# Patient Record
Sex: MALE | Race: WHITE | NOT HISPANIC OR LATINO | Employment: OTHER | URBAN - METROPOLITAN AREA
[De-identification: names, ages, dates, MRNs, and addresses within clinical notes are randomized per-mention and may not be internally consistent; named-entity substitution may affect disease eponyms.]

---

## 2020-11-02 PROBLEM — R94.30 NONSPECIFIC ABNORMAL FUNCTION STUDY, CARDIOVASCULAR: Status: ACTIVE | Noted: 2020-11-02

## 2020-11-04 ENCOUNTER — OFFICE VISIT (OUTPATIENT)
Dept: DIABETES | Facility: CLINIC | Age: 55
End: 2020-11-04
Payer: MEDICAID

## 2020-11-04 ENCOUNTER — TELEPHONE (OUTPATIENT)
Dept: SURGERY | Facility: CLINIC | Age: 55
End: 2020-11-04

## 2020-11-04 VITALS
HEIGHT: 68 IN | SYSTOLIC BLOOD PRESSURE: 148 MMHG | DIASTOLIC BLOOD PRESSURE: 90 MMHG | WEIGHT: 192 LBS | BODY MASS INDEX: 29.1 KG/M2 | HEART RATE: 67 BPM | OXYGEN SATURATION: 99 %

## 2020-11-04 DIAGNOSIS — Z11.59 NEED FOR HEPATITIS C SCREENING TEST: ICD-10-CM

## 2020-11-04 DIAGNOSIS — Z11.4 ENCOUNTER FOR SCREENING FOR HIV: ICD-10-CM

## 2020-11-04 DIAGNOSIS — E78.5 DYSLIPIDEMIA, GOAL LDL BELOW 70: ICD-10-CM

## 2020-11-04 DIAGNOSIS — I10 ESSENTIAL HYPERTENSION: ICD-10-CM

## 2020-11-04 DIAGNOSIS — E11.9 NEW ONSET TYPE 2 DIABETES MELLITUS: ICD-10-CM

## 2020-11-04 DIAGNOSIS — E11.9 NEW ONSET OF TYPE 2 DIABETES MELLITUS IN PEDIATRIC PATIENT: ICD-10-CM

## 2020-11-04 DIAGNOSIS — Z95.5 S/P CORONARY ARTERY STENT PLACEMENT: ICD-10-CM

## 2020-11-04 DIAGNOSIS — E66.3 OVERWEIGHT WITH BODY MASS INDEX (BMI) 25.0-29.9: ICD-10-CM

## 2020-11-04 DIAGNOSIS — E11.65 TYPE 2 DIABETES MELLITUS WITH HYPERGLYCEMIA, WITHOUT LONG-TERM CURRENT USE OF INSULIN: Primary | ICD-10-CM

## 2020-11-04 DIAGNOSIS — E78.1 HYPERTRIGLYCERIDEMIA: ICD-10-CM

## 2020-11-04 DIAGNOSIS — Z71.9 HEALTH EDUCATION/COUNSELING: ICD-10-CM

## 2020-11-04 DIAGNOSIS — I10 ELEVATED BLOOD PRESSURE READING IN OFFICE WITH DIAGNOSIS OF HYPERTENSION: ICD-10-CM

## 2020-11-04 DIAGNOSIS — I25.10 CORONARY ARTERY DISEASE, ANGINA PRESENCE UNSPECIFIED, UNSPECIFIED VESSEL OR LESION TYPE, UNSPECIFIED WHETHER NATIVE OR TRANSPLANTED HEART: ICD-10-CM

## 2020-11-04 PROCEDURE — 99999 PR PBB SHADOW E&M-EST. PATIENT-LVL V: ICD-10-PCS | Mod: PBBFAC,,, | Performed by: NURSE PRACTITIONER

## 2020-11-04 PROCEDURE — 99999 PR PBB SHADOW E&M-EST. PATIENT-LVL V: CPT | Mod: PBBFAC,,, | Performed by: NURSE PRACTITIONER

## 2020-11-04 PROCEDURE — 99204 OFFICE O/P NEW MOD 45 MIN: CPT | Mod: S$PBB,,, | Performed by: NURSE PRACTITIONER

## 2020-11-04 PROCEDURE — 99215 OFFICE O/P EST HI 40 MIN: CPT | Mod: PBBFAC,PN | Performed by: NURSE PRACTITIONER

## 2020-11-04 PROCEDURE — 99204 PR OFFICE/OUTPT VISIT, NEW, LEVL IV, 45-59 MIN: ICD-10-PCS | Mod: S$PBB,,, | Performed by: NURSE PRACTITIONER

## 2020-11-04 RX ORDER — CLOPIDOGREL BISULFATE 75 MG/1
75 TABLET ORAL DAILY
Status: ON HOLD | COMMUNITY

## 2020-11-04 RX ORDER — EMPAGLIFLOZIN 10 MG/1
10 TABLET, FILM COATED ORAL DAILY
Qty: 30 TABLET | Refills: 1 | Status: SHIPPED | OUTPATIENT
Start: 2020-11-04 | End: 2020-11-06

## 2020-11-04 RX ORDER — LOSARTAN POTASSIUM 50 MG/1
25 TABLET ORAL DAILY
Status: ON HOLD | COMMUNITY
Start: 2020-09-18

## 2020-11-04 NOTE — ASSESSMENT & PLAN NOTE
Body mass index is 29.19 kg/m².  Increases insulin resistance.   Discussed DM diet and exercise.

## 2020-11-04 NOTE — PATIENT INSTRUCTIONS
Blood sugar goals:  Goal on medication 100-120  Short term goal 140-150's       Continue Metformin 500 mg 2 times per day   Start Jardiance 10 mg daily we will repeat kidney function in about 5-6 weeks   -- Please let us know if you have nausea, vomiting, or weakness with a normal BG. This could be euglycemic DKA.  -- please hold medication 3 days prior to surgery or if you are sick and have decreased intake.   -- Please drink lots of water daily while on this medication.   --This medication could also give you a yeast infection- please let us know if this occurs and we can treat it.         Snacks can be an important part of a balanced, healthy meal plan. They allow you to eat more frequently, feeling full and satisfied throughout the day. Also, they allow you to spread carbohydrates evenly, which may stabilize blood sugars.  Plus, snacks are enjoyable!     The amount of carbohydrate needed at snacks varies. Generally, less than 15 grams of carbohydrate per snack is recommended.  Below you will find some tasty treats.       0-5 gm carb   Crystal Light   Vitamin Water Zero   Herbal tea, unsweetened   2 tsp peanut butter on celery   1./2 cup sugar-free jell-o   1 sugar-free popsicle   ¼ cup blueberries   8oz Blue Denise unsweetened almond milk   5 baby carrots & celery sticks, cucumbers, bell peppers dipped in ¼ cup salsa, 2Tbsp light ranch dressing or 2Tbsp plain Greek yogurt   10 Goldfish crackers   ½ oz low-fat cheese or string cheese   1 closed handful of nuts, unsalted   1 Tbsp of sunflower seeds, unsalted   1 cup Smart Pop popcorn   1 whole grain brown rice cake        15 gm carb   1 small piece of fruit or ½ banana or 1/2 cup lite canned fruit   3 edvin cracker squares   3 cups Smart Pop popcorn, top spray butter, Gan lite salt or cinnamon and Truvia   5 Vanilla Wafers   ½ cup low fat, no added sugar ice cream or frozen yogurt (Blue bell, Blue Bunny, Weight Watchers, Skinny Cow)   ½  turkey, ham, or chicken sandwich   ½ c fruit with ½ c Cottage cheese   4-6 unsalted wheat crackers with 1 oz low fat cheese or 1 tbsp peanut butter    30-45 goldfish crackers (depending on flavor)    7-8 Yazdanism mini brown rice cakes (caramel, apple cinnamon, chocolate)    12 Yazdanism mini brown rice cakes (cheddar, bbq, ranch)    1/3 cup hummus dip with raw veg   1/2 whole wheat sal, 1Tbsp hummus   Mini Pizza (1/2 whole wheat English muffin, low-fat  cheese, tomato sauce)   100 calorie snack pack (Oreo, Chips Ahoy, Ritz Mix, Baked Cheetos)   4-6 oz. light or Greek Style yogurt (Chobani, Yoplait, Okios, [place]oneyfield)   ½ cup sugar-free pudding     6 in. wheat tortilla or sal oven toasted chips (topped with spray butter flavoring, cinnamon, Truvia OR spray butter, garlic powder, chili powder)    18 BBQ Popchips (available at [employer], [employer], Fresh Market)

## 2020-11-04 NOTE — ASSESSMENT & PLAN NOTE
BP goal is < 140/90.   Tolerating  ARB   Blood pressure goals discussed with patient  BP above goal in clinic today. He has not taken his BP medication today   Reportedly BP runs 120's/70's at home

## 2020-11-04 NOTE — ASSESSMENT & PLAN NOTE
Uncontrolled.   BG readings are above goal despite Metformin and dietary changes.   He does not want to maximize the Metformin at this time.       -- Medication Changes:   Continue metformin 500 mg b.i.d.  Start Jardiance 10 mg daily--repeat BMP in 5 weeks--then will likely increase to 25 mg daily      -- Reviewed goals of therapy are to get the best control we can without hypoglycemia.   -- Advised frequent self blood glucose monitoring.  Patient encouraged to document glucose results and bring them to every clinic visit.  2 times per day at alternating times.  Logs provided  -- Hypoglycemia precautions discussed. Instructed on precautions before driving.    -- Call for Bg repeatedly < 90 or > 180.   -- Close adherence to lifestyle changes recommended.   -- Periodic follow ups for eye evaluations, foot care and dental care suggested.  -- Refer to diabetes education-- new onset diabetes, medications, comprehensive review, diet -- focus on meal planning     --he reports an upcoming eye exam  --he would like to get a colonoscopy--will send message to James

## 2020-11-04 NOTE — ASSESSMENT & PLAN NOTE
On statin per ADA recommendations  LDL goal < 70 ( cardiac hx and risk factors).   Lipids with RTC

## 2020-11-04 NOTE — PROGRESS NOTES
CC:   Chief Complaint   Patient presents with    Diabetes Mellitus     Pt just had a stent placed in his right arm and states that he has not been taking his meds due to this. He feels that his bg levels maybe off bc of the health issues hes been dealing with.       HPI: Lloyd Alonzo is a 55 y.o. male presents for an initial visit today for the management of T2DM.     He was diagnosed with Type 2 diabetes in September 2020 on routine lab work. His A1c was 12% and BG was >400.  Attest to dietary indiscretions prior to diagnosis. He was started on Metformin in mid September 2020. He has never been on insulin therapy.   Since diagnosis he has stopped all snowballs, bread, rice, and pasta.  He reports his blood sugars are still running in the 160s to 200s.  He does not want to increase his metformin as he fears side effects.  At this time he denies any diarrhea, abdominal pain, abdominal bloating.    He does have a significant family history of type 2 diabetes and cardiovascular disease.  He is following with a cardiologist on the [place]- Dr. Hoffman who discovered his type 2 diabetes on routine lab work.  Will request labs from him to review.  He did have an angiogram with 1 stent placement on 11/02--he is supposed to start Plavix.  His cardiologist started him on the metformin, losartan, Crestor, fenofibrate.  Prior to diagnosis in September of 2020 he had not had a routine physical or lab work in over 5 years.    Today in clinic is blood pressure is above goal--he reports that he has not had any of his medications in the last 2 days.  His metformin is currently on hold until tomorrow due to cardiac catheterization    Family hx of diabetes: father, brother, sister, possibly mother   Hospitalized for diabetes: denies     No personal or FH of thyroid cancer or personal of pancreatic cancer or pancreatitis.     DIABETES COMPLICATIONS: cardiovascular disease    11/2020- 1 stent placed     Diabetes Management  Status    ASA:  Yes - 81 mg daily -- going to start Plavix     Statin: Taking-- Crestor and fenofibrate   ACE/ARB: Taking -- Losartan     Screening or Prevention Patient's value Goal Complete/Controlled?   HgA1C Testing and Control   No results found for: HGBA1C   Annually/Less than 8% No   Lipid profile Most Recent Lipid Panel Health Maintenance Topic Completion: Not Found Annually No   LDL control No results found for: LDLCALC Annually/Less than 100 mg/dl  No   Nephropathy screening No results found for: LABMICR  Lab Results   Component Value Date    PROTEINUA Negative 05/24/2019    Annually No   Blood pressure BP Readings from Last 1 Encounters:   11/04/20 (!) 148/90    Less than 140/90 No   Dilated retinal exam Most Recent Eye Exam Date: Not Found-- external-- in [place] -- has an upcoming appointment.  Annually Yes   Foot exam   Most Recent Foot Exam Date: Not Found Annually Yes       CURRENT A1C:    No results found for: HGBA1C    GOAL A1C: 6.5% without hypoglycemia       DM MEDICATIONS USED IN THE PAST: Metformin     CURRENT DIABETES MEDICATIONS: Metformin 500 mg BID   Insulin: N/A   Missed doses: denies  Right now on hold due to cardiac catheterization       BLOOD GLUCOSE MONITORING:  He checks his BG 1-2 times per day   Per oral recall: 200's mostly   Lowest was 140 one day   170  194      HYPOGLYCEMIA:  No      MEALS: eating 3 meals per day   Red meat, a lot of chicken   No bread, rice, pasta since diagnosis   Can't eat seafood due to allergy   Snack: almonds and walnuts and boiled eggs   Drinks: diet soda and crystal light        CURRENT EXERCISE:  No    Review of Systems  Review of Systems   Constitutional: Negative for appetite change, fatigue and unexpected weight change.   HENT: Negative for trouble swallowing.    Eyes: Negative for visual disturbance.   Respiratory: Negative for shortness of breath.    Cardiovascular: Negative for chest pain.   Gastrointestinal: Negative for nausea.   Endocrine:  Negative for polydipsia, polyphagia and polyuria.   Genitourinary:        No Nocturia    Skin: Negative for wound.   Neurological: Negative for numbness.       Physical Exam   Physical Exam  Vitals signs and nursing note reviewed.   Constitutional:       Appearance: He is well-developed.      Comments: Overweight male patient    HENT:      Head: Normocephalic and atraumatic.      Right Ear: External ear normal.      Left Ear: External ear normal.      Nose: Nose normal.   Neck:      Musculoskeletal: Normal range of motion and neck supple.      Thyroid: No thyromegaly.      Trachea: No tracheal deviation.   Cardiovascular:      Rate and Rhythm: Normal rate and regular rhythm.      Heart sounds: No murmur.   Pulmonary:      Effort: Pulmonary effort is normal. No respiratory distress.      Breath sounds: Normal breath sounds.   Abdominal:      Palpations: Abdomen is soft.      Tenderness: There is no abdominal tenderness.      Hernia: No hernia is present.   Skin:     General: Skin is warm and dry.      Capillary Refill: Capillary refill takes less than 2 seconds.      Findings: No rash.      Comments:      Neurological:      Mental Status: He is alert and oriented to person, place, and time.      Cranial Nerves: No cranial nerve deficit.   Psychiatric:         Behavior: Behavior normal.         Judgment: Judgment normal.         FOOT EXAMINATION: wearing Crocs     Protective Sensation (w/ 10 gram monofilament):  Right: Intact  Left: Intact    Visual Inspection:  Normal -  Bilateral, Nails Intact - without Evidence of Foot Deformity- Bilateral, Dry Skin -  Bilateral and Onychomycosis -  Left    Pedal Pulses:   Right: Present  Left: Present    Posterior tibialis:   Right:Present  Left: Present        Lab Results   Component Value Date    TSH 2.07 05/24/2019           Type 2 diabetes mellitus with hyperglycemia, without long-term current use of insulin  Uncontrolled.   BG readings are above goal despite Metformin and  dietary changes.   He does not want to maximize the Metformin at this time.       -- Medication Changes:   Continue metformin 500 mg b.i.d.  Start Jardiance 10 mg daily--repeat BMP in 5 weeks--then will likely increase to 25 mg daily      -- Reviewed goals of therapy are to get the best control we can without hypoglycemia.   -- Advised frequent self blood glucose monitoring.  Patient encouraged to document glucose results and bring them to every clinic visit.  2 times per day at alternating times.  Logs provided  -- Hypoglycemia precautions discussed. Instructed on precautions before driving.    -- Call for Bg repeatedly < 90 or > 180.   -- Close adherence to lifestyle changes recommended.   -- Periodic follow ups for eye evaluations, foot care and dental care suggested.  -- Refer to diabetes education-- new onset diabetes, medications, comprehensive review, diet -- focus on meal planning     --he reports an upcoming eye exam  --he would like to get a colonoscopy--will send message to James    Dyslipidemia, goal LDL below 70  On statin per ADA recommendations  LDL goal < 70 ( cardiac hx and risk factors).   Lipids with RTC         Hypertriglyceridemia  On fenofibrate and statin   Optimize BG readings   Check lipids with RTC     Essential hypertension  BP goal is < 140/90.   Tolerating  ARB   Blood pressure goals discussed with patient  BP above goal in clinic today. He has not taken his BP medication today   Reportedly BP runs 120's/70's at home     CAD (coronary artery disease)  Optimize BG readings.   Continue to follow with cardiology       S/P coronary artery stent placement  Optimize BG readings.   See above.       Overweight with body mass index (BMI) 25.0-29.9  Body mass index is 29.19 kg/m².  Increases insulin resistance.   Discussed DM diet and exercise.       Spent 60 minutes with patient with >50% time spent in counseling on diet, medication options, new diagnosis.       Addendum 11/06/2020---insurance  has denied the request for Jardiance PA.  Rationale being that he needs to be on metformin for at least 90 days within the previous 180..  Started metformin after diagnosis of diabetes in September 2020  Will increase metformin to 1000 mg b.i.d. and add in glipizide 5 mg extended release daily with breakfast    Addendum 11/06/2020--received external labs from cardiologist Dr. Hoffman office.  His A1c was 12.3% on labs drawn in September 2020 and his triglycerides were 1848  See attached documents for scanned records      Follow up in about 6 weeks (around 12/16/2020).  Request records from cardiologist please   Schedule with [facility] DM education- diet, new diagnosis of diabetes, comprehensive review   Labs in 5 weeks.   F/u with me in 6 weeks.       Orders Placed This Encounter   Procedures    Basic Metabolic Panel     Standing Status:   Future     Standing Expiration Date:   1/3/2022    Hemoglobin A1C     Standing Status:   Future     Standing Expiration Date:   5/4/2022    Lipid Panel     Standing Status:   Future     Standing Expiration Date:   5/4/2022    Microalbumin/Creatinine Ratio, Urine     Standing Status:   Future     Standing Expiration Date:   5/4/2022     Order Specific Question:   Specimen Source     Answer:   Urine    Hepatitis C Antibody     Standing Status:   Future     Standing Expiration Date:   5/4/2022    HIV 1/2 Ag/Ab (4th Gen)     Standing Status:   Future     Standing Expiration Date:   5/4/2022    Ambulatory referral/consult to Diabetes Education     Standing Status:   Future     Standing Expiration Date:   11/4/2021     Referral Priority:   Routine     Referral Type:   Consultation     Referral Reason:   Specialty Services Required     Referred to Provider:   Maite Mukherjee RD, CDE     Requested Specialty:   Diabetes     Number of Visits Requested:   1       Recommendations were discussed with the patient in detail  The patient verbalized understanding and agrees with the plan outlined  as above.     This note was partly generated with [employer] voice recognition software. I apologize for any possible typographical errors.

## 2020-11-05 ENCOUNTER — CLINICAL SUPPORT (OUTPATIENT)
Dept: DIABETES | Facility: CLINIC | Age: 55
End: 2020-11-05
Payer: MEDICAID

## 2020-11-05 VITALS — WEIGHT: 192 LBS | BODY MASS INDEX: 29.1 KG/M2 | HEIGHT: 68 IN

## 2020-11-05 DIAGNOSIS — E11.65 TYPE 2 DIABETES MELLITUS WITH HYPERGLYCEMIA, WITHOUT LONG-TERM CURRENT USE OF INSULIN: ICD-10-CM

## 2020-11-05 PROCEDURE — 99999 PR PBB SHADOW E&M-EST. PATIENT-LVL III: ICD-10-PCS | Mod: PBBFAC,,, | Performed by: DIETITIAN, REGISTERED

## 2020-11-05 PROCEDURE — 99213 OFFICE O/P EST LOW 20 MIN: CPT | Mod: PBBFAC,PN | Performed by: DIETITIAN, REGISTERED

## 2020-11-05 PROCEDURE — 99999 PR PBB SHADOW E&M-EST. PATIENT-LVL III: CPT | Mod: PBBFAC,,, | Performed by: DIETITIAN, REGISTERED

## 2020-11-06 ENCOUNTER — TELEPHONE (OUTPATIENT)
Dept: DIABETES | Facility: CLINIC | Age: 55
End: 2020-11-06

## 2020-11-06 DIAGNOSIS — E11.65 TYPE 2 DIABETES MELLITUS WITH HYPERGLYCEMIA, WITHOUT LONG-TERM CURRENT USE OF INSULIN: Primary | ICD-10-CM

## 2020-11-06 RX ORDER — METFORMIN HYDROCHLORIDE 1000 MG/1
1000 TABLET ORAL 2 TIMES DAILY WITH MEALS
Qty: 180 TABLET | Refills: 1 | Status: SHIPPED | OUTPATIENT
Start: 2020-11-06 | End: 2021-09-13

## 2020-11-06 RX ORDER — GLIPIZIDE 5 MG/1
5 TABLET, FILM COATED, EXTENDED RELEASE ORAL
Qty: 90 TABLET | Refills: 1 | Status: SHIPPED | OUTPATIENT
Start: 2020-11-06 | End: 2020-12-16 | Stop reason: SINTOL

## 2020-11-06 NOTE — TELEPHONE ENCOUNTER
Left vm for pt in ref:  Please call patient and let him know that they have denied his Jardiance.  The reason being is that he has not been on metformin for at least 90 days.  My recommendations would be to increase his metformin to a 1000 mg 2 times per day.  We will also add in a medication called glipizide 5 mg daily with breakfast.   Make sure to take the Glipizide with food.   We can try to submit for the Jardiance after his next visit when he has been on the metformin for at least 90 days.   I have sent prescriptions to his pharmacy   Thank you

## 2020-11-06 NOTE — TELEPHONE ENCOUNTER
Please call patient and let him know that they have denied his Jardiance.  The reason being is that he has not been on metformin for at least 90 days.  My recommendations would be to increase his metformin to a 1000 mg 2 times per day.  We will also add in a medication called glipizide 5 mg daily with breakfast.   Make sure to take the Glipizide with food.   We can try to submit for the Jardiance after his next visit when he has been on the metformin for at least 90 days.   I have sent prescriptions to his pharmacy   Thank you

## 2020-11-10 ENCOUNTER — TELEPHONE (OUTPATIENT)
Dept: SURGERY | Facility: CLINIC | Age: 55
End: 2020-11-10

## 2020-11-10 NOTE — TELEPHONE ENCOUNTER
----- Message from Monisha Mark NP sent at 11/4/2020 10:15 AM CST -----  Please reach out to patient to schedule colonoscopy.     Thank you

## 2020-11-11 LAB
LEFT EYE DM RETINOPATHY: NEGATIVE
RIGHT EYE DM RETINOPATHY: NEGATIVE

## 2020-11-13 ENCOUNTER — TELEPHONE (OUTPATIENT)
Dept: SURGERY | Facility: CLINIC | Age: 55
End: 2020-11-13

## 2020-11-17 ENCOUNTER — PATIENT OUTREACH (OUTPATIENT)
Dept: ADMINISTRATIVE | Facility: HOSPITAL | Age: 55
End: 2020-11-17

## 2020-12-01 ENCOUNTER — TELEPHONE (OUTPATIENT)
Dept: DIABETES | Facility: CLINIC | Age: 55
End: 2020-12-01

## 2020-12-01 NOTE — TELEPHONE ENCOUNTER
Patient advised he spoke with Ying or Kaylie. Advised he could get xray's prior to appt and would like to be seen sooner. No Referral is self referral

## 2020-12-01 NOTE — TELEPHONE ENCOUNTER
"----- Message from Shashi Whitmore sent at 12/1/2020  3:03 PM CST -----   Consult/Advisory:    Name Of Caller: Lloyd  Contact Preference?: [phone]    Does patient feel the need to be seen today? No     What is the nature of the call?:  -pt returning call to Fouzia Cartwright RN    Additional Notes:  "Thank you for all that you do for our patients'"    "

## 2020-12-03 ENCOUNTER — TELEPHONE (OUTPATIENT)
Dept: ORTHOPEDICS | Facility: CLINIC | Age: 55
End: 2020-12-03

## 2020-12-03 DIAGNOSIS — M25.511 RIGHT SHOULDER PAIN, UNSPECIFIED CHRONICITY: Primary | ICD-10-CM

## 2020-12-03 NOTE — TELEPHONE ENCOUNTER
Spoke with pt. Advised pt that xrays were ordered for his shoulder and if a sooner appt becomes available I will call him. All questions answered. Pt verbalized understanding.

## 2020-12-03 NOTE — TELEPHONE ENCOUNTER
----- Message from Brooke Snyder sent at 12/3/2020  4:33 PM CST -----  Contact: Lloyd # [phone]  Pt calling to get appt for xrays

## 2020-12-09 ENCOUNTER — TELEPHONE (OUTPATIENT)
Dept: ORTHOPEDICS | Facility: CLINIC | Age: 55
End: 2020-12-09

## 2020-12-09 DIAGNOSIS — E11.65 TYPE 2 DIABETES MELLITUS WITH HYPERGLYCEMIA, WITHOUT LONG-TERM CURRENT USE OF INSULIN: Primary | ICD-10-CM

## 2020-12-09 NOTE — TELEPHONE ENCOUNTER
Spoke with pt. Offered pt a sooner appt. appt rescheduled. All questions answered. Pt verbalized understanding.

## 2020-12-09 NOTE — TELEPHONE ENCOUNTER
----- Message from Joan Stewart sent at 12/9/2020  2:29 PM CST -----  Contact: pt  Returning a call.        Call back :[phone]

## 2020-12-11 ENCOUNTER — OFFICE VISIT (OUTPATIENT)
Dept: ORTHOPEDICS | Facility: CLINIC | Age: 55
End: 2020-12-11
Payer: MEDICAID

## 2020-12-11 VITALS
TEMPERATURE: 98 F | OXYGEN SATURATION: 98 % | BODY MASS INDEX: 29.4 KG/M2 | WEIGHT: 194 LBS | RESPIRATION RATE: 18 BRPM | HEIGHT: 68 IN

## 2020-12-11 DIAGNOSIS — M75.101 ROTATOR CUFF SYNDROME, RIGHT: ICD-10-CM

## 2020-12-11 DIAGNOSIS — M75.21 BICEPS TENDINITIS OF RIGHT SHOULDER: ICD-10-CM

## 2020-12-11 PROCEDURE — 99214 OFFICE O/P EST MOD 30 MIN: CPT | Mod: PBBFAC,PN | Performed by: ORTHOPAEDIC SURGERY

## 2020-12-11 PROCEDURE — 99999 PR PBB SHADOW E&M-EST. PATIENT-LVL IV: ICD-10-PCS | Mod: PBBFAC,,, | Performed by: ORTHOPAEDIC SURGERY

## 2020-12-11 PROCEDURE — 99203 OFFICE O/P NEW LOW 30 MIN: CPT | Mod: S$PBB,,, | Performed by: ORTHOPAEDIC SURGERY

## 2020-12-11 PROCEDURE — 99203 PR OFFICE/OUTPT VISIT, NEW, LEVL III, 30-44 MIN: ICD-10-PCS | Mod: S$PBB,,, | Performed by: ORTHOPAEDIC SURGERY

## 2020-12-11 PROCEDURE — 99999 PR PBB SHADOW E&M-EST. PATIENT-LVL IV: CPT | Mod: PBBFAC,,, | Performed by: ORTHOPAEDIC SURGERY

## 2020-12-11 RX ORDER — METOPROLOL SUCCINATE 25 MG/1
TABLET, EXTENDED RELEASE ORAL
Status: ON HOLD | COMMUNITY
Start: 2020-11-17

## 2020-12-11 RX ORDER — DICLOFENAC SODIUM 75 MG/1
75 TABLET, DELAYED RELEASE ORAL 2 TIMES DAILY
Qty: 60 TABLET | Refills: 1 | Status: ON HOLD | OUTPATIENT
Start: 2020-12-11

## 2020-12-11 NOTE — PROGRESS NOTES
Subjective:    Patient ID:  Lloyd Alonzo is a 55 y.o. y.o. male who presents for initial visit for Pain of the Right Shoulder      54 yo male, RHD, reports longstanding h/o right shoulder terri, insidious onset. Pain localized anteriorly, sharp and aggravated with overhead motions, reaching behind the back and when he lies on his right side at night. Denies neck pain, radiating arm pain, motor weakness, paresthesias or constitutional sxs. Has not had any specific treatment.             Past Medical History:   Diagnosis Date    Anticoagulant long-term use     Diabetes mellitus     General anesthetics causing adverse effect in therapeutic use     Hyperlipidemia     Hypertension         Past Surgical History:   Procedure Laterality Date    APPENDECTOMY      CORONARY ANGIOGRAPHY N/A 11/2/2020    Procedure: ANGIOGRAM, CORONARY ARTERY;  Surgeon: Esvin Hoffman MD;  Location: [facility];  Service: Cardiology;  Laterality: N/A;       Review of patient's allergies indicates:   Allergen Reactions    Shellfish containing products Swelling          Current Outpatient Medications:     aspirin (ECOTRIN) 81 MG EC tablet, Take 81 mg by mouth once daily., Disp: , Rfl:     clopidogreL (PLAVIX) 75 mg tablet, Take 75 mg by mouth once daily., Disp: , Rfl:     fenofibrate (TRICOR) 145 MG tablet, Take 145 mg by mouth once daily., Disp: , Rfl:     glipiZIDE (GLUCOTROL) 5 MG TR24, Take 1 tablet (5 mg total) by mouth daily with breakfast., Disp: 90 tablet, Rfl: 1    losartan (COZAAR) 50 MG tablet, Take 25 mg by mouth once daily. , Disp: , Rfl:     metFORMIN (GLUCOPHAGE) 1000 MG tablet, Take 1 tablet (1,000 mg total) by mouth 2 (two) times daily with meals., Disp: 180 tablet, Rfl: 1    metoprolol succinate (TOPROL-XL) 25 MG 24 hr tablet, TK 1/2 T PO QD, Disp: , Rfl:     rosuvastatin (CRESTOR) 10 MG tablet, Take 10 mg by mouth once daily., Disp: , Rfl:     Social History     Socioeconomic History    Marital status: Legally  "     Spouse name: Not on file    Number of children: Not on file    Years of education: Not on file    Highest education level: Not on file   Occupational History    Not on file   Social Needs    Financial resource strain: Not on file    Food insecurity     Worry: Not on file     Inability: Not on file    Transportation needs     Medical: Not on file     Non-medical: Not on file   Tobacco Use    Smoking status: Never Smoker    Smokeless tobacco: Never Used    Tobacco comment: cigar now and then   Substance and Sexual Activity    Alcohol use: No     Frequency: Never    Drug use: No    Sexual activity: Not on file   Lifestyle    Physical activity     Days per week: Not on file     Minutes per session: Not on file    Stress: Not on file   Relationships    Social connections     Talks on phone: Not on file     Gets together: Not on file     Attends Christianity service: Not on file     Active member of club or organization: Not on file     Attends meetings of clubs or organizations: Not on file     Relationship status: Not on file   Other Topics Concern    Not on file   Social History Narrative    Not on file        No family history on file.     Review of Systems   Constitutional: Negative for chills and fever.   HENT: Negative for hearing loss.    Eyes: Negative for blurred vision.   Respiratory: Negative for shortness of breath.    Cardiovascular: Negative for chest pain.   Gastrointestinal: Negative for nausea and vomiting.   Genitourinary: Negative for dysuria.   Musculoskeletal: Negative for myalgias.   Skin: Negative for rash.   Neurological: Negative for speech change and loss of consciousness.   Endo/Heme/Allergies: Does not bruise/bleed easily.   Psychiatric/Behavioral: Negative for depression.        Objective:     Temp 97.5 °F (36.4 °C) (Temporal)   Resp 18   Ht 5' 8" (1.727 m)   Wt 88 kg (194 lb 0.1 oz)   SpO2 98%   BMI 29.50 kg/m²     Ortho Exam     56 yo male in NAD; alert, " oriented x 3; normal mood and affect    Head: atraumatic  Eyes: EOM are normal. Right eye exhibits no discharge. Left eye exhibits no discharge  Cardiovascular: normal rate    Pulmonary/Chest: effort normal; no respiratory distress  Abdominal: soft    BUE: N/V intact    Right shoulder: tender bicipital groove; NT AC joint; FROM except IR to L1; positive Neer's/Hawkin's/Mila's/[place]co's; negative Speed's/Yergason's/drop arm/lift-off      Imaging:     X-rays 3-view right shoulder dated 12/9/2020 are independently reviewed by me and show mild AC joint degenerative change; Type II acromion.      Assessment & Plan:      1. Rotator cuff syndrome, right    2. Biceps tendinitis of right shoulder       1.  Findings, diagnosis, treatment options/risks/benefits were reviewed  2.  Voltaren 75 mg po bid  3.  PT  4.  Maintain judicious activity modification/limitation  5.  Follow-up in 3 months, consider subacromial cortisone injection right shoulder if sxs persist and/o worsen

## 2020-12-14 ENCOUNTER — TELEPHONE (OUTPATIENT)
Dept: DIABETES | Facility: CLINIC | Age: 55
End: 2020-12-14

## 2020-12-14 NOTE — TELEPHONE ENCOUNTER
"----- Message from Medina Borden sent at 12/14/2020  1:51 PM CST -----  Patient Assist    Name of caller:  Lloyd   Contact Preference:  [phone]   Does Patient feel the need to see the MD today? No   Physician:   What is the nature of the call?    - pt missed call regarding his results and scheduling. Please attempt to reach him again    Additional Notes:   "Thank you for all that you do for our patients'"          "

## 2020-12-15 ENCOUNTER — TELEPHONE (OUTPATIENT)
Dept: DIABETES | Facility: CLINIC | Age: 55
End: 2020-12-15

## 2020-12-15 NOTE — TELEPHONE ENCOUNTER
"----- Message from Medina Borden sent at 12/15/2020  2:58 PM CST -----  Patient Assist    Name of caller:  Lloyd   Contact Preference:  [phone]  Does Patient feel the need to see the MD today? No   Physician:   What is the nature of the call?    - please call pt per his request to consult with his nurse.     Additional Notes:   "Thank you for all that you do for our patients'"          "

## 2020-12-16 ENCOUNTER — TELEPHONE (OUTPATIENT)
Dept: DIABETES | Facility: CLINIC | Age: 55
End: 2020-12-16

## 2020-12-16 DIAGNOSIS — E11.65 TYPE 2 DIABETES MELLITUS WITH HYPERGLYCEMIA, WITHOUT LONG-TERM CURRENT USE OF INSULIN: Primary | ICD-10-CM

## 2020-12-16 RX ORDER — EMPAGLIFLOZIN 10 MG/1
10 TABLET, FILM COATED ORAL DAILY
Qty: 30 TABLET | Refills: 0 | Status: ON HOLD | OUTPATIENT
Start: 2020-12-16

## 2021-01-21 ENCOUNTER — TELEPHONE (OUTPATIENT)
Dept: DIABETES | Facility: CLINIC | Age: 56
End: 2021-01-21

## 2021-03-11 ENCOUNTER — TELEPHONE (OUTPATIENT)
Dept: ORTHOPEDICS | Facility: CLINIC | Age: 56
End: 2021-03-11

## 2021-03-17 ENCOUNTER — OFFICE VISIT (OUTPATIENT)
Dept: UROLOGY | Facility: CLINIC | Age: 56
End: 2021-03-17
Payer: MEDICAID

## 2021-03-17 VITALS
HEIGHT: 68 IN | DIASTOLIC BLOOD PRESSURE: 75 MMHG | WEIGHT: 187.19 LBS | BODY MASS INDEX: 28.37 KG/M2 | HEART RATE: 74 BPM | SYSTOLIC BLOOD PRESSURE: 129 MMHG

## 2021-03-17 DIAGNOSIS — N52.1 ERECTILE DYSFUNCTION DUE TO TYPE 2 DIABETES MELLITUS: ICD-10-CM

## 2021-03-17 DIAGNOSIS — E11.69 ERECTILE DYSFUNCTION DUE TO TYPE 2 DIABETES MELLITUS: ICD-10-CM

## 2021-03-17 DIAGNOSIS — Z12.5 ENCOUNTER FOR PROSTATE CANCER SCREENING: Primary | ICD-10-CM

## 2021-03-17 PROCEDURE — 99999 PR PBB SHADOW E&M-EST. PATIENT-LVL III: CPT | Mod: PBBFAC,,, | Performed by: NURSE PRACTITIONER

## 2021-03-17 PROCEDURE — 99213 OFFICE O/P EST LOW 20 MIN: CPT | Mod: PBBFAC,PN | Performed by: NURSE PRACTITIONER

## 2021-03-17 PROCEDURE — 99999 PR PBB SHADOW E&M-EST. PATIENT-LVL III: ICD-10-PCS | Mod: PBBFAC,,, | Performed by: NURSE PRACTITIONER

## 2021-03-17 PROCEDURE — 99214 PR OFFICE/OUTPT VISIT, EST, LEVL IV, 30-39 MIN: ICD-10-PCS | Mod: S$PBB,,, | Performed by: NURSE PRACTITIONER

## 2021-03-17 PROCEDURE — 99214 OFFICE O/P EST MOD 30 MIN: CPT | Mod: S$PBB,,, | Performed by: NURSE PRACTITIONER

## 2021-03-17 RX ORDER — TADALAFIL 20 MG/1
20 TABLET ORAL DAILY
Qty: 10 TABLET | Refills: 0 | Status: SHIPPED | OUTPATIENT
Start: 2021-03-17 | End: 2021-04-12

## 2021-03-17 RX ORDER — TADALAFIL 20 MG/1
20 TABLET ORAL DAILY PRN
COMMUNITY
Start: 2021-03-04 | End: 2021-03-17

## 2021-03-25 ENCOUNTER — TELEPHONE (OUTPATIENT)
Dept: UROLOGY | Facility: CLINIC | Age: 56
End: 2021-03-25

## 2021-03-31 ENCOUNTER — OFFICE VISIT (OUTPATIENT)
Dept: UROLOGY | Facility: CLINIC | Age: 56
End: 2021-03-31
Payer: MEDICAID

## 2021-03-31 DIAGNOSIS — E11.69 ERECTILE DYSFUNCTION DUE TO TYPE 2 DIABETES MELLITUS: ICD-10-CM

## 2021-03-31 DIAGNOSIS — Z12.5 ENCOUNTER FOR PROSTATE CANCER SCREENING: Primary | ICD-10-CM

## 2021-03-31 DIAGNOSIS — N52.1 ERECTILE DYSFUNCTION DUE TO TYPE 2 DIABETES MELLITUS: ICD-10-CM

## 2021-03-31 PROCEDURE — 99214 OFFICE O/P EST MOD 30 MIN: CPT | Mod: 95,,, | Performed by: NURSE PRACTITIONER

## 2021-03-31 PROCEDURE — 99214 PR OFFICE/OUTPT VISIT, EST, LEVL IV, 30-39 MIN: ICD-10-PCS | Mod: 95,,, | Performed by: NURSE PRACTITIONER

## 2021-09-11 DIAGNOSIS — E11.65 TYPE 2 DIABETES MELLITUS WITH HYPERGLYCEMIA, WITHOUT LONG-TERM CURRENT USE OF INSULIN: ICD-10-CM

## 2021-09-13 RX ORDER — METFORMIN HYDROCHLORIDE 1000 MG/1
TABLET ORAL
Qty: 180 TABLET | Refills: 0 | Status: ON HOLD | OUTPATIENT
Start: 2021-09-13

## 2022-02-03 ENCOUNTER — TELEPHONE (OUTPATIENT)
Dept: UROLOGY | Facility: CLINIC | Age: 57
End: 2022-02-03
Payer: MEDICAID

## 2022-02-03 NOTE — TELEPHONE ENCOUNTER
----- Message from Marisol Meraz sent at 2/3/2022 12:47 PM CST -----  Regarding: Pt. Advise  Contact: patient  Pt. Requesting a call back to speak with provider pt. did not want to give any details.                  Pt.@[phone]

## 2022-02-03 NOTE — TELEPHONE ENCOUNTER
Spoke with pt. Pt stated that Cialis medication isn't working for him. Would like to discuss another option. Stated that I will speak with provider and give him a call back. Pt verbalized understanding

## 2022-06-26 DIAGNOSIS — E11.65 TYPE 2 DIABETES MELLITUS WITH HYPERGLYCEMIA, WITHOUT LONG-TERM CURRENT USE OF INSULIN: ICD-10-CM

## 2022-06-27 RX ORDER — METFORMIN HYDROCHLORIDE 1000 MG/1
TABLET ORAL
Qty: 180 TABLET | Refills: 0 | OUTPATIENT
Start: 2022-06-27

## 2023-03-27 NOTE — PROGRESS NOTES
Diabetes Education  Author: Maite Mukherjee RD, CDE  Date: 11/6/2020    Diabetes Care Management Summary  Diabetes Education Record Assessment/Progress: Initial  Current Diabetes Risk Level: Low         Diabetes Type  Diabetes Type : Type II    Diabetes History  Diabetes Diagnosis: 0-1 year  Current Treatment: Oral Medication, Diet(metformin and jardiance)  Reviewed Problem List with Patient: Yes    Health Maintenance was reviewed today with patient. Discussed with patient importance of routine eye exams, foot exams/foot care, blood work (i.e.: A1c, microalbumin, and lipid), dental visits, yearly flu vaccine, and pneumonia vaccine as indicated by PCP. Patient verbalized understanding.     Health Maintenance Topics with due status: Not Due       Topic Last Completion Date    High Dose Statin 11/04/2020    Foot Exam 11/04/2020     Health Maintenance Due   Topic Date Due    Hepatitis C Screening  1965    Lipid Panel  1965    Hemoglobin A1c  1965    Eye Exam  08/24/1973    HIV Screening  08/24/1978    TETANUS VACCINE  08/24/1981    Pneumococcal Vaccine (Medium Risk) (1 of 1 - PPSV23) 08/24/1982    Shingles Vaccine (1 of 2) 08/24/2013    Colorectal Cancer Screening  08/24/2013    Influenza Vaccine (1) 08/01/2020       Nutrition  Meal Planning: skipping meals, eats out seldom(used to eat out often)  What type of beverages do you drink?: regular soda/tea, water, other (see comments)(sweet tea, snowballs over the summer)  Meal Plan 24 Hour Recall - Breakfast: 2:00 pm chili with lentils and beans and a pound of ground meat  Meal Plan 24 Hour Recall - Lunch: lean meats like roast, fruit, and vegetables  Meal Plan 24 Hour Recall - Dinner: lean meats like roast, fruit, and vegetables    Monitoring   Monitoring: Other  Self Monitoring : true metrix, testing twice a day  Blood Glucose Logs: No  Do you use a personal continuous glucose monitor?: No  In the last month, how often have you had a low blood  sugar reaction?: never  What are your symptoms of low blood sugar?: able to recall shaky and sweaty  How do you treat low blood sugar?: soda  Can you tell when your blood sugar is too high?: no  How do you treat high blood sugar?: medication    Exercise   Exercise Type: none  Frequency: Never    Current Diabetes Treatment   Current Treatment: Oral Medication, Diet(metformin and jardiance)    Social History  Preferred Learning Method: Face to Face  Primary Support: Self, Daughter, Family  Educational Level: College Graduate  Occupation: currently not working do to covid  Smoking Status: Never a Smoker  Alcohol Use: Never            DDS-2 Score  ( > 3 = SIGNIFICANT DISTRESS): 1                   Barriers to Change  Barriers to Change: None  Learning Challenges : None    Readiness to Learn   Readiness to Learn : Acceptance    Cultural Influences  Cultural Influences: No    Diabetes Education Assessment/Progress  Diabetes Disease Process (diabetes disease process and treatment options): Individual Session, Written Materials Provided, Instructed, Discussion, Comprehends Key Points  Nutrition (Incorporating nutritional management into one's lifestyle): Individual Session, Written Materials Provided, Instructed, Discussion, Comprehends Key Points  Physical Activity (incorporating physical activity into one's lifestyle): Written Materials Provided, Individual Session, Instructed, Discussion, Comprehends Key Points  Medications (states correct name, dose, onset, peak, duration, side effects & timing of meds): Individual Session, Written Materials Provided, Instructed, Discussion, Comprehends Key Points  Monitoring (monitoring blood glucose/other parameters & using results): Individual Session, Written Materials Provided, Instructed, Discussion, Comprehends Key Points  Acute Complications (preventing, detecting, and treating acute complications): Individual Session, Written Materials Provided, Instructed, Discussion,  Comprehends Key Points  Chronic Complications (preventing, detecting, and treating chronic complications): Individual Session, Written Materials Provided, Instructed, Discussion, Comprehends Key Points  Clinical (diabetes, other pertinent medical history, and relevant comorbidities reviewed during visit): Individual Session, Written Materials Provided, Instructed, Discussion, Comprehends Key Points  Cognitive (knowledge of self-management skills, functional health literacy): Individual Session, Written Materials Provided, Instructed, Discussion, Comprehends Key Points  Psychosocial (emotional response to diabetes): Individual Session, Written Materials Provided, Instructed, Discussion, Comprehends Key Points  Diabetes Distress and Support Systems: Individual Session, Written Materials Provided, Instructed, Discussion, Comprehends Key Points  Behavioral (readiness for change, lifestyle practices, self-care behaviors): Individual Session, Written Materials Provided, Instructed, Discussion, Comprehends Key Points  Patient educated on what is DM, T1DM, T2DM, risk factors, managing DM, DM diet, carbohydrate counting, meal planning, reading a food label, healthy snack options, benefits of physical activity, diabetes care schedule, foot care guidelines, diabetes and retinopathy screening, s/s hypo and hyperglycemia, long/short term complication of uncontrolled DM, importance of compliance with treatment plan, how to use a glucometer, reviewed understanding diabetes distress, medications for treating DM, their mechanism of action and possible side effects, reviewed current level and goal level for HgbA1c, blood glucose, microalbumin, and lipids. Patient provided with written literature, diabetes management resources and support, DM Management program contact information.    Goals  Patient has selected/evaluated goals during today's session: Yes, selected  Healthy Eating: Set  Start Date: 11/05/20  Target Date: 12/05/20          Diabetes Care Plan/Intervention  Education Plan/Intervention: Individual Follow-Up DSMT    Diabetes Meal Plan  Restrictions: Low Fat, Low Sodium, Restricted Carbohydrate  Calories: 1800  Carbohydrate Per Meal: 30-45g  Carbohydrate Per Snack : 7-15g  Fat: 50  Protein: 135    Today's Self-Management Care Plan was developed with the patient's input and is based on barriers identified during today's assessment.    The long and short-term goals in the care plan were written with the patient/caregiver's input. The patient has agreed to work toward these goals to improve his overall diabetes control.      The patient received a copy of today's self-management plan and verbalized understanding of the care plan, goals, and all of today's instructions.      The patient was encouraged to communicate with his physician and care team regarding his condition(s) and treatment.  I provided the patient with my contact information today and encouraged him to contact me via phone or patient portal as needed.     Education Units of Time   Time Spent: 60 min     Use Enhanced Medication Counseling?: No

## 2024-04-03 PROBLEM — R10.13 EPIGASTRIC ABDOMINAL PAIN: Status: ACTIVE | Noted: 2024-04-03

## 2024-04-03 PROBLEM — D72.829 LEUKOCYTOSIS: Status: ACTIVE | Noted: 2024-04-03

## 2024-04-03 PROBLEM — K85.00 IDIOPATHIC ACUTE PANCREATITIS WITHOUT INFECTION OR NECROSIS: Status: ACTIVE | Noted: 2024-04-03

## 2024-04-03 PROBLEM — D72.829 LEUKOCYTOSIS: Status: RESOLVED | Noted: 2024-04-03 | Resolved: 2024-04-03

## 2024-04-03 PROBLEM — R10.13 EPIGASTRIC ABDOMINAL PAIN: Status: RESOLVED | Noted: 2024-04-03 | Resolved: 2024-04-03

## 2024-05-02 ENCOUNTER — TELEPHONE (OUTPATIENT)
Dept: UROLOGY | Facility: CLINIC | Age: 59
End: 2024-05-02
Payer: MEDICAID

## 2024-05-02 NOTE — TELEPHONE ENCOUNTER
Called to schedule patient to establish care with Dr. Bertrand on 5/13/2024 at 2pm.  Informed patient that we will reach out the Friday before to confirm appointment.  Patient verbalized understanding.    MARYANA Renteria

## 2024-05-02 NOTE — TELEPHONE ENCOUNTER
----- Message from Melissa Guadalupe sent at 5/2/2024  4:41 PM CDT -----  Contact: [phone]  PATIENTCALL      Pt is calling to speak with someone in provider office regarding he want to schedule  an appt for a check up no appt in epic. States he don't want to see NP Percle. He is asking for a return call please call

## 2024-05-13 ENCOUNTER — OFFICE VISIT (OUTPATIENT)
Dept: UROLOGY | Facility: CLINIC | Age: 59
End: 2024-05-13
Payer: MEDICAID

## 2024-05-13 VITALS
DIASTOLIC BLOOD PRESSURE: 78 MMHG | HEIGHT: 68 IN | BODY MASS INDEX: 29.23 KG/M2 | SYSTOLIC BLOOD PRESSURE: 136 MMHG | WEIGHT: 192.88 LBS | HEART RATE: 82 BPM

## 2024-05-13 DIAGNOSIS — N52.9 ERECTILE DYSFUNCTION, UNSPECIFIED ERECTILE DYSFUNCTION TYPE: Primary | ICD-10-CM

## 2024-05-13 DIAGNOSIS — Z12.5 PROSTATE CANCER SCREENING: ICD-10-CM

## 2024-05-13 PROCEDURE — 3046F HEMOGLOBIN A1C LEVEL >9.0%: CPT | Mod: CPTII,,, | Performed by: STUDENT IN AN ORGANIZED HEALTH CARE EDUCATION/TRAINING PROGRAM

## 2024-05-13 PROCEDURE — 3075F SYST BP GE 130 - 139MM HG: CPT | Mod: CPTII,,, | Performed by: STUDENT IN AN ORGANIZED HEALTH CARE EDUCATION/TRAINING PROGRAM

## 2024-05-13 PROCEDURE — 3078F DIAST BP <80 MM HG: CPT | Mod: CPTII,,, | Performed by: STUDENT IN AN ORGANIZED HEALTH CARE EDUCATION/TRAINING PROGRAM

## 2024-05-13 PROCEDURE — 4010F ACE/ARB THERAPY RXD/TAKEN: CPT | Mod: CPTII,,, | Performed by: STUDENT IN AN ORGANIZED HEALTH CARE EDUCATION/TRAINING PROGRAM

## 2024-05-13 PROCEDURE — 99999 PR PBB SHADOW E&M-EST. PATIENT-LVL III: CPT | Mod: PBBFAC,,, | Performed by: STUDENT IN AN ORGANIZED HEALTH CARE EDUCATION/TRAINING PROGRAM

## 2024-05-13 PROCEDURE — 99213 OFFICE O/P EST LOW 20 MIN: CPT | Mod: PBBFAC,PN | Performed by: STUDENT IN AN ORGANIZED HEALTH CARE EDUCATION/TRAINING PROGRAM

## 2024-05-13 PROCEDURE — 3008F BODY MASS INDEX DOCD: CPT | Mod: CPTII,,, | Performed by: STUDENT IN AN ORGANIZED HEALTH CARE EDUCATION/TRAINING PROGRAM

## 2024-05-13 PROCEDURE — 99204 OFFICE O/P NEW MOD 45 MIN: CPT | Mod: S$PBB,,, | Performed by: STUDENT IN AN ORGANIZED HEALTH CARE EDUCATION/TRAINING PROGRAM

## 2024-05-13 PROCEDURE — 1159F MED LIST DOCD IN RCRD: CPT | Mod: CPTII,,, | Performed by: STUDENT IN AN ORGANIZED HEALTH CARE EDUCATION/TRAINING PROGRAM

## 2024-05-13 RX ORDER — NEBIVOLOL 5 MG/1
5 TABLET ORAL
COMMUNITY
Start: 2024-03-12

## 2024-05-13 RX ORDER — SILDENAFIL 100 MG/1
100 TABLET, FILM COATED ORAL DAILY PRN
Qty: 10 TABLET | Refills: 12 | Status: SHIPPED | OUTPATIENT
Start: 2024-05-13 | End: 2025-05-13

## 2024-05-13 NOTE — PROGRESS NOTES
"[facility] - [facility] [address]   Clinic Note    SUBJECTIVE:     Chief Complaint: ED    History of Present Illness:  Stuart Alonzo is a 58 y.o. male who presents to clinic for ED. He is new to our clinic referred by Aaareferral Self.     Patient has CAD s/p PCI in 2020. Has Rx for nitrates prescribed initially after this, but he has never once taken.   He has previously taken Viagra but this was on a full stomach. He has taken Cialis with some effect.     IPSS Questionnaire (AUA-SS):  Over the past month    1)  Incomplete Emptying - How often have you had a sensation of not emptying your bladder completely after you finish urinating?  0 - Not at all   2)  Frequency - How often have you had to urinate again less than two hours after you finished urinating? 3 - About half the time   3)  Intermittency - How often have you found you stopped and started again several times when you urinated?  0 - Not at all   4) Urgency - How difficult have you found it to postpone urination?  1 - Less than 1 time in 5   5) Weak Stream - How often have you had a weak urinary stream?  0 - Not at all   6) Straining  - How often have you had to push or strain to begin urination?  0 - Not at all   7) Nocturia - How many times did you most typically get up to urinate from the time you went to bed until the time you got up in the morning?  3 - 3 times   Total score:  0-7 mild, 8-19 moderate, 20-35 severe 7   Quality of Life:  3 - Mixed      He reports fatigue, stress, poor sleep.     PMH notable for NIDDM2 (4/2024 A1c 9.2%)    Anticoagulation:  No    OBJECTIVE:     Estimated body mass index is 29.33 kg/m² as calculated from the following:    Height as of this encounter: 5' 8" (1.727 m).    Weight as of this encounter: 87.5 kg (192 lb 14.4 oz).    Vital Signs (Most Recent)  Pulse: 82 (05/13/24 1402)  BP: 136/78 (05/13/24 1402)    Physical Exam  Vitals reviewed.   Constitutional:       Appearance: Normal appearance.   HENT:      Head: " Normocephalic and atraumatic.   Eyes:      Conjunctiva/sclera: Conjunctivae normal.   Cardiovascular:      Rate and Rhythm: Normal rate.   Pulmonary:      Effort: Pulmonary effort is normal.   Abdominal:      General: Abdomen is flat. There is no distension.      Tenderness: There is no abdominal tenderness.   Musculoskeletal:         General: Normal range of motion.   Skin:     General: Skin is warm and dry.   Neurological:      General: No focal deficit present.      Mental Status: He is alert and oriented to person, place, and time.   Psychiatric:         Mood and Affect: Mood normal.         Behavior: Behavior normal.         Thought Content: Thought content normal.         Judgment: Judgment normal.         Lab Results   Component Value Date    BUN 16 04/03/2024    CREATININE 0.9 04/03/2024    WBC 8.41 04/03/2024    HGB 15.4 04/03/2024    HCT 47.0 04/03/2024     04/03/2024    AST 11 04/03/2024    ALT 13 04/03/2024    ALKPHOS 48 (L) 04/03/2024    ALBUMIN 3.7 04/03/2024    HGBA1C 9.2 (H) 04/03/2024        Lab Results   Component Value Date    PSADIAG 0.43 03/18/2021       ASSESSMENT     1. Erectile dysfunction, unspecified erectile dysfunction type    2. Prostate cancer screening      PLAN:   1. Erectile dysfunction, unspecified erectile dysfunction type  -     TESTOSTERONE; Future; Expected date: 05/13/2024  -     PSA, Screening; Future; Expected date: 05/13/2024    2. Prostate cancer screening  -     PSA, Screening; Future; Expected date: 05/13/2024    Other orders  -     sildenafiL (VIAGRA) 100 MG tablet; Take 1 tablet (100 mg total) by mouth daily as needed for Erectile Dysfunction.  Dispense: 10 tablet; Refill: 12     Rx for Viagra 100 mg sent. Patient instructed to not take nitrates if he has taken Viagra. Instructed to take Viagra on an empty stomach.  Will check AM testosterone.  Recommend tight BG control.   RTC 6 weeks.    Brooks Bertrand MD

## 2024-06-12 ENCOUNTER — TELEPHONE (OUTPATIENT)
Dept: UROLOGY | Facility: CLINIC | Age: 59
End: 2024-06-12
Payer: MEDICAID

## 2024-06-12 NOTE — TELEPHONE ENCOUNTER
Attempted to call patient.  Left voicemail informing patient that his labs are due.  Advised patient that the testosterone needs to be completed before 10am.  Advised patient to give us a call back with any questions or concerns.    MARYANA Renteria

## 2024-06-24 ENCOUNTER — OFFICE VISIT (OUTPATIENT)
Dept: UROLOGY | Facility: CLINIC | Age: 59
End: 2024-06-24
Payer: MEDICAID

## 2024-06-24 VITALS
SYSTOLIC BLOOD PRESSURE: 110 MMHG | DIASTOLIC BLOOD PRESSURE: 69 MMHG | WEIGHT: 180.56 LBS | BODY MASS INDEX: 27.45 KG/M2 | HEART RATE: 89 BPM

## 2024-06-24 DIAGNOSIS — N52.9 ERECTILE DYSFUNCTION, UNSPECIFIED ERECTILE DYSFUNCTION TYPE: Primary | ICD-10-CM

## 2024-06-24 PROCEDURE — 4010F ACE/ARB THERAPY RXD/TAKEN: CPT | Mod: CPTII,,, | Performed by: STUDENT IN AN ORGANIZED HEALTH CARE EDUCATION/TRAINING PROGRAM

## 2024-06-24 PROCEDURE — 3046F HEMOGLOBIN A1C LEVEL >9.0%: CPT | Mod: CPTII,,, | Performed by: STUDENT IN AN ORGANIZED HEALTH CARE EDUCATION/TRAINING PROGRAM

## 2024-06-24 PROCEDURE — 3008F BODY MASS INDEX DOCD: CPT | Mod: CPTII,,, | Performed by: STUDENT IN AN ORGANIZED HEALTH CARE EDUCATION/TRAINING PROGRAM

## 2024-06-24 PROCEDURE — 3078F DIAST BP <80 MM HG: CPT | Mod: CPTII,,, | Performed by: STUDENT IN AN ORGANIZED HEALTH CARE EDUCATION/TRAINING PROGRAM

## 2024-06-24 PROCEDURE — 99213 OFFICE O/P EST LOW 20 MIN: CPT | Mod: PBBFAC,PN | Performed by: STUDENT IN AN ORGANIZED HEALTH CARE EDUCATION/TRAINING PROGRAM

## 2024-06-24 PROCEDURE — 99999 PR PBB SHADOW E&M-EST. PATIENT-LVL III: CPT | Mod: PBBFAC,,, | Performed by: STUDENT IN AN ORGANIZED HEALTH CARE EDUCATION/TRAINING PROGRAM

## 2024-06-24 PROCEDURE — 3074F SYST BP LT 130 MM HG: CPT | Mod: CPTII,,, | Performed by: STUDENT IN AN ORGANIZED HEALTH CARE EDUCATION/TRAINING PROGRAM

## 2024-06-24 PROCEDURE — 1159F MED LIST DOCD IN RCRD: CPT | Mod: CPTII,,, | Performed by: STUDENT IN AN ORGANIZED HEALTH CARE EDUCATION/TRAINING PROGRAM

## 2024-06-24 PROCEDURE — 99212 OFFICE O/P EST SF 10 MIN: CPT | Mod: S$PBB,,, | Performed by: STUDENT IN AN ORGANIZED HEALTH CARE EDUCATION/TRAINING PROGRAM

## 2024-06-24 NOTE — PROGRESS NOTES
"[facility] - [facility] [address]   Clinic Note    SUBJECTIVE:     Chief Complaint: ED    History of Present Illness:  Stuart Alonzo is a 58 y.o. male who presents to clinic for ED. He is established to our clinic, last seen by me 05/2024.    Patient has CAD s/p PCI in 2020. Has Rx for nitrates prescribed initially after this, but he has never once taken.   He has previously taken Viagra but this was on a full stomach. He has taken Cialis with some effect. At last visit, Viagra 100 mg was prescribed, and patient was instructed to take on an empty stomach. AM T was normal (362.)      He reports fatigue, stress, poor sleep. Viagra working well for ED.    PMH notable for NIDDM2 (4/2024 A1c 9.2%)    Anticoagulation:  No    OBJECTIVE:     Estimated body mass index is 27.45 kg/m² as calculated from the following:    Height as of 5/13/24: 5' 8" (1.727 m).    Weight as of this encounter: 81.9 kg (180 lb 8.9 oz).    Vital Signs (Most Recent)  Pulse: 89 (06/24/24 1511)  BP: 110/69 (06/24/24 1511)    Physical Exam  Vitals reviewed.   Constitutional:       Appearance: Normal appearance.   HENT:      Head: Normocephalic and atraumatic.   Eyes:      Conjunctiva/sclera: Conjunctivae normal.   Cardiovascular:      Rate and Rhythm: Normal rate.   Pulmonary:      Effort: Pulmonary effort is normal.   Abdominal:      General: Abdomen is flat. There is no distension.      Tenderness: There is no abdominal tenderness.   Musculoskeletal:         General: Normal range of motion.   Skin:     General: Skin is warm and dry.   Neurological:      General: No focal deficit present.      Mental Status: He is alert and oriented to person, place, and time.   Psychiatric:         Mood and Affect: Mood normal.         Behavior: Behavior normal.         Thought Content: Thought content normal.         Judgment: Judgment normal.         Lab Results   Component Value Date    BUN 16 04/03/2024    CREATININE 0.9 04/03/2024    WBC 8.41 04/03/2024    HGB 15.4 " 04/03/2024    HCT 47.0 04/03/2024     04/03/2024    AST 11 04/03/2024    ALT 13 04/03/2024    ALKPHOS 48 (L) 04/03/2024    ALBUMIN 3.7 04/03/2024    HGBA1C 9.2 (H) 04/03/2024        Lab Results   Component Value Date    PSADIAG 0.43 03/18/2021       ASSESSMENT     1. Erectile dysfunction, unspecified erectile dysfunction type        PLAN:   1. Erectile dysfunction, unspecified erectile dysfunction type  -     Ambulatory referral/consult to Endocrinology; Future; Expected date: 07/01/2024      Patient having some symptoms consistent with low testosterone, but his level is borderline, and he does not feel like his symptoms are due to low T and is not interested in TRT.   Continue Viagra 100 mg PRN.  RTC PRN    Brooks Bertrand MD

## 2024-08-05 ENCOUNTER — OFFICE VISIT (OUTPATIENT)
Dept: UROLOGY | Facility: CLINIC | Age: 59
End: 2024-08-05
Payer: MEDICAID

## 2024-08-05 VITALS
HEIGHT: 68 IN | BODY MASS INDEX: 28.03 KG/M2 | WEIGHT: 184.94 LBS | HEART RATE: 84 BPM | DIASTOLIC BLOOD PRESSURE: 66 MMHG | SYSTOLIC BLOOD PRESSURE: 133 MMHG

## 2024-08-05 DIAGNOSIS — R59.0 INGUINAL LYMPHADENOPATHY: Primary | ICD-10-CM

## 2024-08-05 PROCEDURE — 3046F HEMOGLOBIN A1C LEVEL >9.0%: CPT | Mod: CPTII,,, | Performed by: STUDENT IN AN ORGANIZED HEALTH CARE EDUCATION/TRAINING PROGRAM

## 2024-08-05 PROCEDURE — 3075F SYST BP GE 130 - 139MM HG: CPT | Mod: CPTII,,, | Performed by: STUDENT IN AN ORGANIZED HEALTH CARE EDUCATION/TRAINING PROGRAM

## 2024-08-05 PROCEDURE — 3008F BODY MASS INDEX DOCD: CPT | Mod: CPTII,,, | Performed by: STUDENT IN AN ORGANIZED HEALTH CARE EDUCATION/TRAINING PROGRAM

## 2024-08-05 PROCEDURE — 4010F ACE/ARB THERAPY RXD/TAKEN: CPT | Mod: CPTII,,, | Performed by: STUDENT IN AN ORGANIZED HEALTH CARE EDUCATION/TRAINING PROGRAM

## 2024-08-05 PROCEDURE — 1159F MED LIST DOCD IN RCRD: CPT | Mod: CPTII,,, | Performed by: STUDENT IN AN ORGANIZED HEALTH CARE EDUCATION/TRAINING PROGRAM

## 2024-08-05 PROCEDURE — 99214 OFFICE O/P EST MOD 30 MIN: CPT | Mod: S$PBB,,, | Performed by: STUDENT IN AN ORGANIZED HEALTH CARE EDUCATION/TRAINING PROGRAM

## 2024-08-05 PROCEDURE — 99213 OFFICE O/P EST LOW 20 MIN: CPT | Mod: PBBFAC,PN | Performed by: STUDENT IN AN ORGANIZED HEALTH CARE EDUCATION/TRAINING PROGRAM

## 2024-08-05 PROCEDURE — 3078F DIAST BP <80 MM HG: CPT | Mod: CPTII,,, | Performed by: STUDENT IN AN ORGANIZED HEALTH CARE EDUCATION/TRAINING PROGRAM

## 2024-08-05 PROCEDURE — 99999 PR PBB SHADOW E&M-EST. PATIENT-LVL III: CPT | Mod: PBBFAC,,, | Performed by: STUDENT IN AN ORGANIZED HEALTH CARE EDUCATION/TRAINING PROGRAM

## 2024-08-05 RX ORDER — AZITHROMYCIN 500 MG/1
1000 TABLET, FILM COATED ORAL DAILY
Qty: 6 TABLET | Refills: 0 | Status: SHIPPED | OUTPATIENT
Start: 2024-08-05 | End: 2024-08-08

## 2024-09-24 ENCOUNTER — TELEPHONE (OUTPATIENT)
Dept: OTOLARYNGOLOGY | Facility: CLINIC | Age: 59
End: 2024-09-24
Payer: MEDICAID

## 2024-09-24 NOTE — TELEPHONE ENCOUNTER
----- Message from Ginny Simpson sent at 9/24/2024 12:49 PM CDT -----  Regarding: Appt  Contact: Pt [phone]  Pt is calling to schedule a np appt for ear infection no dates avail please call

## 2024-09-24 NOTE — TELEPHONE ENCOUNTER
"Called pt back. Pt states that he is prone to getting ear infections. States that he "used to get them all of the time". States that he "just needs a Zpak" and it will clear up. Apologized and let pt know that Dr. Parra is at the [facility] location on Fridays only, and that the next available appointments are not until December at that location. Also apologized and let pt know that the [facility] location appears to be the only location that is accepting his insurance at this time. Advised pt to check with his insurance to see what other ENT providers in his area take his insurance. Advised that he can also try going to Urgent Care for treatment of the acute issue and schedule a routine visit with ENT for next available if he would like. Pt states that he will check with the insurance to find another provider. Thanks, Angeli  "

## 2024-12-04 ENCOUNTER — TELEPHONE (OUTPATIENT)
Dept: UROLOGY | Facility: CLINIC | Age: 59
End: 2024-12-04
Payer: MEDICAID

## 2024-12-04 NOTE — TELEPHONE ENCOUNTER
----- Message from Yanira sent at 12/4/2024 12:09 PM CST -----  Contact: Patient  [phone]  [email or web address]     Patient is calling for Medical Advice regarding: patient has tried to get this Endo referral apt scheduled in vain. Could you assist    How long has patient had these symptoms:    Pharmacy name and phone#:    Patient wants a call back or thru [facility]:    Comments:Patient  [phone]    Please advise patient replies from provider may take up to 48 hours.

## 2024-12-04 NOTE — TELEPHONE ENCOUNTER
Attempted to call patient.  Left voicemail informing patient to give us a call back with any questions or concerns.  Informed patient that a message has been sent to endo staff.  Advised patient to give us a call if he has not contacted anyone by the end of the week.    MARYANA Renteria

## 2024-12-31 ENCOUNTER — TELEPHONE (OUTPATIENT)
Dept: UROLOGY | Facility: CLINIC | Age: 59
End: 2024-12-31
Payer: MEDICAID

## 2024-12-31 DIAGNOSIS — E11.65 TYPE 2 DIABETES MELLITUS WITH HYPERGLYCEMIA, WITHOUT LONG-TERM CURRENT USE OF INSULIN: ICD-10-CM

## 2024-12-31 DIAGNOSIS — E78.5 DYSLIPIDEMIA, GOAL LDL BELOW 70: Primary | ICD-10-CM

## 2024-12-31 NOTE — TELEPHONE ENCOUNTER
Called patient and he stated that he is unable to get into the endocrinologist soon and is need of a refill of his Fenofibrate 145mg and Jardiance. Explained to patient that you may not be able to fill these medication. I don't see an appointment scheduled with the endocrinologist in the computer. Patient insisted that I send you a message. Explained that I would send the message, but not sure if you could fill it. Patient stated if you couldn't then that is fine.

## 2024-12-31 NOTE — TELEPHONE ENCOUNTER
----- Message from Jay sent at 12/31/2024  9:40 AM CST -----  Contact: Pt  [phone]  Pt called in regards to speaking with staff pt states he has a concern did not say what it was please advise.

## 2025-01-08 ENCOUNTER — TELEPHONE (OUTPATIENT)
Dept: ENDOCRINOLOGY | Facility: CLINIC | Age: 60
End: 2025-01-08
Payer: MEDICAID

## 2025-01-09 NOTE — TELEPHONE ENCOUNTER
Spoke with patient offering 02/20/2025 with Winnie Tatum, NP at 0900. Patient refused appointment, stating he wants to see a MD because he can see an NP closer to his home. Message received.    Aditi Berumen LPN  [facility]

## 2025-07-02 NOTE — TELEPHONE ENCOUNTER
----- Message from Carrillo Marks sent at 12/3/2020  2:16 PM CST -----  Regarding: Return call  Contact: Pt  PT returning Suzi's call.    PT # [phone]     Adjustment disorder with mixed disturbance of emotions and conduct   F43.25